# Patient Record
Sex: MALE | Race: BLACK OR AFRICAN AMERICAN | NOT HISPANIC OR LATINO | URBAN - NONMETROPOLITAN AREA
[De-identification: names, ages, dates, MRNs, and addresses within clinical notes are randomized per-mention and may not be internally consistent; named-entity substitution may affect disease eponyms.]

---

## 2021-04-02 ENCOUNTER — IMPORTED ENCOUNTER (OUTPATIENT)
Dept: URBAN - NONMETROPOLITAN AREA CLINIC 1 | Facility: CLINIC | Age: 66
End: 2021-04-02

## 2021-04-02 PROBLEM — H52.4: Noted: 2021-04-02

## 2021-04-02 PROBLEM — E11.9: Noted: 2021-04-02

## 2021-04-02 PROBLEM — H25.813: Noted: 2021-04-02

## 2021-04-02 PROCEDURE — 92004 COMPRE OPH EXAM NEW PT 1/>: CPT

## 2021-04-02 PROCEDURE — 92015 DETERMINE REFRACTIVE STATE: CPT

## 2021-04-02 NOTE — PATIENT DISCUSSION
Presbyopia OUDiscussed refractive status in detail with patient. MR done today but do not recommend updating due to cataract progression. Continue to monitor. IDDM sans Retinopathy Discussed diagnosis with patient. Discussed the risk of diabetic damage of the retina with potential vision loss and the importance of routine follow-up. Emphasized strict blood sugar control. Continue to monitor. Combined Cataract OUDiscussed diagnosis with patient. Reviewed symptoms related to cataract progression. Discussed various treatment options with patient. Recommend cataract evaluation by Dr. Salma Holliday. Patient elects to schedule evaluation.

## 2021-05-19 ENCOUNTER — IMPORTED ENCOUNTER (OUTPATIENT)
Dept: URBAN - NONMETROPOLITAN AREA CLINIC 1 | Facility: CLINIC | Age: 66
End: 2021-05-19

## 2021-05-19 PROCEDURE — 92014 COMPRE OPH EXAM EST PT 1/>: CPT

## 2021-05-19 NOTE — PATIENT DISCUSSION
Cataract(s)-Visually significant cataract OU .-Cataract(s) causing symptomatic impairment of visual function not correctable with a tolerable change in glasses or contact lenses lighting or non-operative means resulting in specific activity limitations and/or participation restrictions including but not limited to reading viewing television driving or meeting vocational or recreational needs. -Expectation is clearer vision and functional improvement in symptoms as well as reduced glare disability after cataract removal.-Order IOLMaster and OPD today. -Recommend Stand/Trad  based on today's OPD testing and lifestyle questionnaire.-All questions were answered regarding surgery including pre and post-op medications appointments activity restrictions and anesthetic usage.-The risks benefits and alternatives and special risk factors for the patient were discussed in detail including but not limited to: bleeding infection retinal detachment vitreous loss problems with the implant and possible need for additional surgery.-Although rare the possibility of complete vision loss was discussed.-The possible need for glasses post-operatively was discussed.-Order medical clearance exam based on history of diabetes-Patient elects to proceed with cataract surgery OS . Will schedule at patient's convenience and re-evaluate OD  in the future. Discussed all lens and he elects Stand/Trad OU. Explained need for bifocals s/p surgery. Dot blots noted OU on todays exam OS > OD  discussed thsi with patient and recommend seek evaluation @ [facility] s/p CE OU.

## 2021-05-20 PROBLEM — H25.813: Noted: 2021-05-19

## 2021-05-20 PROBLEM — E11.3293: Noted: 2021-05-20

## 2021-06-01 PROBLEM — E78.5: Noted: 2021-06-01

## 2021-06-01 PROBLEM — I10: Noted: 2021-06-01

## 2021-06-01 PROBLEM — E11.9: Noted: 2021-06-01

## 2021-06-01 PROBLEM — Z01.818: Noted: 2021-06-01

## 2021-06-02 ENCOUNTER — IMPORTED ENCOUNTER (OUTPATIENT)
Dept: URBAN - NONMETROPOLITAN AREA CLINIC 1 | Facility: CLINIC | Age: 66
End: 2021-06-02

## 2021-06-02 PROCEDURE — 99214 OFFICE O/P EST MOD 30 MIN: CPT

## 2021-06-08 ENCOUNTER — IMPORTED ENCOUNTER (OUTPATIENT)
Dept: URBAN - NONMETROPOLITAN AREA CLINIC 1 | Facility: CLINIC | Age: 66
End: 2021-06-08

## 2021-06-08 PROCEDURE — 66984 XCAPSL CTRC RMVL W/O ECP: CPT

## 2021-06-08 PROCEDURE — 99024 POSTOP FOLLOW-UP VISIT: CPT

## 2021-06-08 PROCEDURE — 92136 OPHTHALMIC BIOMETRY: CPT

## 2021-06-08 NOTE — PATIENT DISCUSSION
Same Day POV CE OS Stand/Trad 6/8/21- Discussed diagnosis in detail with patient- Patient is stable and doing well- Wound intact- Continue all post op drops as directed- IOP slightly increased at 21 start Alphagan P OS BID - Continue to monitorCataract OD-Visually significant.-Cataract(s) causing symptomatic impairment of visual function not correctable with a tolerable change in glasses or contact lenses lighting or non-operative means resulting in specific activity limitations and/or participation restrictions including but not limited to reading viewing television driving or meeting vocational or recreational needs. -Expectation is clearer vision and functional improvement in symptoms as well as reduced glare disability after cataract removal.-Order IOLMaster and OPD today. -Recommend Stand/Trad  based on today's OPD testing and lifestyle questionnaire.-All questions were answered regarding surgery including pre and post-op medications appointments activity restrictions and anesthetic usage.-The risks benefits and alternatives and special risk factors for the patient were discussed in detail including but not limited to: bleeding infection retinal detachment vitreous loss problems with the implant and possible need for additional surgery.-Although rare the possibility of complete vision loss was discussed.-The possible need for glasses post-operatively was discussed.-Order medical clearance exam based on history of diabetes-Patient elects to proceed with cataract surgery OD. Will schedule at patient's convenience. Discussed all lens and he elects Stand/Trad OU. Explained need for bifocals s/p surgery. Dot blots noted OU on todays exam OS > OD  discussed this with patient and recommend seek evaluation @ [facility] s/p CE OU.

## 2021-06-15 ENCOUNTER — IMPORTED ENCOUNTER (OUTPATIENT)
Dept: URBAN - NONMETROPOLITAN AREA CLINIC 1 | Facility: CLINIC | Age: 66
End: 2021-06-15

## 2021-06-15 PROBLEM — Z98.42: Noted: 2021-06-15

## 2021-06-15 PROBLEM — H25.811: Noted: 2021-06-15

## 2021-06-15 PROCEDURE — 99024 POSTOP FOLLOW-UP VISIT: CPT

## 2021-06-15 PROCEDURE — 66984 XCAPSL CTRC RMVL W/O ECP: CPT

## 2021-06-15 PROCEDURE — 92136 OPHTHALMIC BIOMETRY: CPT

## 2021-06-15 NOTE — PATIENT DISCUSSION
s/p PCIOL Same Day POV CE OD Stand/Trad-Pt doing well s/p PCIOL. -Continue post-op gtts according to instruction sheet and sleep with eye shield over eye for 7 nights.-Avoid bending at the waist lifting anything over 5lbs and dirty or delroy environments. -IOP elevated @ 40 2nd to sx today reduced at slit lamp with no complications to a 20R/K PCIOL CE OS Stand/trad -Pt doing well  s/p PCIOL. -Continue post-op gtts according to instruction sheet.-Okay to resume usual activites and d/c eye shield. *Discussed with patient again the need for retinal eval to evaluate PDR. Patient requested info on this today.  Advised patient to allow both eyes to heal form cataract surgery and keep apts with Dr. Jimmy Holt and Dr. Mariel Valdez to make referral to [facility]**

## 2021-06-19 PROBLEM — Z98.41: Noted: 2021-06-19

## 2021-06-19 PROBLEM — Z98.42: Noted: 2021-06-19

## 2021-06-25 ENCOUNTER — IMPORTED ENCOUNTER (OUTPATIENT)
Dept: URBAN - NONMETROPOLITAN AREA CLINIC 1 | Facility: CLINIC | Age: 66
End: 2021-06-25

## 2021-06-25 PROCEDURE — 99024 POSTOP FOLLOW-UP VISIT: CPT

## 2021-06-25 NOTE — PATIENT DISCUSSION
1 Week POV CE OD 6/15/21 CE OS 6/8/21 Standard OU- Discussed diagnosis in detail with patient- Patient doing well and stable- Continue post op drops as directed- Continue to monitor.- RTC in 3 weeks for POV with MR and MAC OCT *Discussed with patient again the need for retinal eval to evaluate PDR.  Advised patient to allow both eyes to heal form cataract surgery and keep apts with Dr. Kevin Kirby and Dr. Winnie Patel to make referral to [facility]**

## 2021-07-16 ENCOUNTER — IMPORTED ENCOUNTER (OUTPATIENT)
Dept: URBAN - NONMETROPOLITAN AREA CLINIC 1 | Facility: CLINIC | Age: 66
End: 2021-07-16

## 2021-07-16 PROBLEM — Z98.41: Noted: 2021-06-19

## 2021-07-16 PROBLEM — Z98.42: Noted: 2021-06-19

## 2021-07-16 PROBLEM — E11.3313: Noted: 2021-07-16

## 2021-07-16 PROCEDURE — 99024 POSTOP FOLLOW-UP VISIT: CPT

## 2021-07-16 NOTE — PATIENT DISCUSSION
4 Week POV CE OD 6/15/21 CE OS 6/8/21 Standard OU- Discussed diagnosis in detail with patient- Patient doing well and stable- Finished post op drops as directed- Continue to monitor. IDDM moderate NPDR OU Discussed diagnosis with patient. Discussed the risk of diabetic damage of the retina with potential vision loss and the importance of routine follow-up. Emphasized strict blood sugar control. OCT done today: and shows macular edema OS>ODRecommend refer to [facility] for evaluation and treatment. Patient elects to schedule.

## 2022-04-09 ASSESSMENT — VISUAL ACUITY
OD_CC: 20/40
OS_CC: 20/300
OS_CC: 20/200
OD_PH: 20/30
OS_PH: 20/70
OD_PAM: 20/20
OD_CC: 20/20
OS_CC: 20/400
OD_CC: 20/30-1
OS_CC: 20/80
OD_CC: 20/20-2
OS_PH: 20/60-
OD_PAM: 20/20
OS_CC: 20/200
OD_CC: 20/30
OS_CC: 20/200+
OS_AM: 20/NI
OD_CC: 20/29-2
OS_PH: 20/70-
OD_CC: 20/40
OS_CC: 20/200
OS_PH: 20/100

## 2022-04-09 ASSESSMENT — TONOMETRY
OD_IOP_MMHG: 18
OD_IOP_MMHG: 40
OS_IOP_MMHG: 13
OD_IOP_MMHG: 18
OS_IOP_MMHG: 20
OS_IOP_MMHG: 15
OD_IOP_MMHG: 14
OS_IOP_MMHG: 16
OD_IOP_MMHG: 18
OD_IOP_MMHG: 16
OS_IOP_MMHG: 20
OS_IOP_MMHG: 21
OD_IOP_MMHG: 15